# Patient Record
Sex: MALE | Race: WHITE
[De-identification: names, ages, dates, MRNs, and addresses within clinical notes are randomized per-mention and may not be internally consistent; named-entity substitution may affect disease eponyms.]

---

## 2019-07-13 ENCOUNTER — HOSPITAL ENCOUNTER (EMERGENCY)
Dept: HOSPITAL 7 - FB.ED | Age: 24
Discharge: HOME | End: 2019-07-13
Payer: COMMERCIAL

## 2019-07-13 DIAGNOSIS — L03.116: Primary | ICD-10-CM

## 2019-07-13 DIAGNOSIS — R59.1: ICD-10-CM

## 2019-07-13 PROCEDURE — 85025 COMPLETE CBC W/AUTO DIFF WBC: CPT

## 2019-07-13 PROCEDURE — 80053 COMPREHEN METABOLIC PANEL: CPT

## 2019-07-13 PROCEDURE — 96361 HYDRATE IV INFUSION ADD-ON: CPT

## 2019-07-13 PROCEDURE — 74177 CT ABD & PELVIS W/CONTRAST: CPT

## 2019-07-13 PROCEDURE — 86618 LYME DISEASE ANTIBODY: CPT

## 2019-07-13 PROCEDURE — 36415 COLL VENOUS BLD VENIPUNCTURE: CPT

## 2019-07-13 PROCEDURE — 86140 C-REACTIVE PROTEIN: CPT

## 2019-07-13 PROCEDURE — 99284 EMERGENCY DEPT VISIT MOD MDM: CPT

## 2019-07-13 PROCEDURE — 96374 THER/PROPH/DIAG INJ IV PUSH: CPT

## 2019-07-13 PROCEDURE — 81001 URINALYSIS AUTO W/SCOPE: CPT

## 2019-07-13 NOTE — EDM.PDOC
ED HPI GENERAL MEDICAL PROBLEM





- General


Chief Complaint: Fever


Stated Complaint: FEVER


Time Seen by Provider: 07/13/19 20:35


Source of Information: Reports: Patient


History Limitations: Reports: No Limitations





- History of Present Illness


INITIAL COMMENTS - FREE TEXT/NARRATIVE: 





24-year-old male who reports that approximately 3 AM today when he was getting 

off work he developed shaking chills and had generalized malaise. When he went 

home he noted that he had a fever of 102.6 later on that morning he continued 

to have intermittent chills and shaking through the day but no measured fever (

temp was 95-98F). This afternoon he began to develop left thigh and groin pain 

with some left lower quadrant abdominal pain. He had no nausea or vomiting. He 

been urinating normally. He did have a normal bowel movement this morning while 

he was at work still. There is been no sore throat. No cough. No nasal 

congestion. He has been drinking liquids well but his appetite has been 

somewhat off. No known tick exposure but they were on a camping trip over the 

Fourth of July. He is rating the pain in his left medial thigh, groin and lower 

abdomen as about a 6/10 when he is up and moving. It is a 1/10 when he is at 

rest. No body aches. The pain does radiate. There's been no trauma to the area 

of his leg. There is no redness or swelling in this area although he did notice 

a lump in his left groin area. There are no other associated signs or symptoms. 

There are no other modifying factors.


Onset: Today (3 AM)


Duration: Getting Worse


Location: Reports: Abdomen, Lower Extremity, Left (And groin)


Quality: Reports: Ache, Sharp, Stabbing, Throbbing


Severity: Moderate


Improves with: Reports: Rest


Worsens with: Reports: Other (Palpation), Movement


Context: Reports: Other (As above)


Associated Symptoms: Reports: Fever/Chills, Loss of Appetite


Treatments PTA: Reports: Other (see below) (Nothing)


  ** Left Inner Thigh


Pain Score (Numeric/FACES): 6





- Related Data


 Allergies











Allergy/AdvReac Type Severity Reaction Status Date / Time


 


No Known Allergies Allergy   Verified 07/13/19 20:33











Home Meds: 


 Home Meds





Cephalexin [Keflex] 750 mg PO TID 10 Days #90 capsule 07/13/19 [Rx]











Past Medical History





- Past Health History


Medical/Surgical History: Denies Medical/Surgical History (No chronic medical 

problems. Surgical history as detailed below.)





- Past Surgical History


HEENT Surgical History: Reports: Tonsillectomy





Social & Family History





- Tobacco Use


Smoking Status *Q: Never Smoker





- Alcohol Use


Alcohol Use History: Yes


Alcohol Use Comment: Occasional alcohol use.





- Living Situation & Occupation


Living situation: Reports: 


Occupation: Employed (Works at [employer].)


Social History Comment: He is here with his wife.





ED ROS GENERAL





- Review of Systems


Review Of Systems: See Below


Constitutional: Reports: Fever, Chills, Malaise


HEENT: Reports: No Symptoms


Respiratory: Reports: No Symptoms


Cardiovascular: Reports: No Symptoms


GI/Abdominal: Reports: Abdominal Pain (Left lower quadrant.)


: Reports: No Symptoms


Musculoskeletal: Reports: Other (Left groin and left medial thigh pain)


Skin: Reports: No Symptoms


Neurological: Reports: No Symptoms


Hematologic/Lymphatic: Reports: No Symptoms


Immunologic: Reports: Other (No known tick exposure.)





ED EXAM, GENERAL





- Physical Exam


Exam: See Below


Exam Limited By: No Limitations


General Appearance: Alert, WD/WN, Mild Distress


Eye Exam: Bilateral Eye: EOMI, Normal Inspection, PERRL


Ears: Normal External Exam


Ear Exam: Bilateral Ear: Auricle Normal


Nose: Normal Inspection, Normal Mucosa, No Blood


Throat/Mouth: Normal Voice, No Airway Compromise, Other (Dry mucous membranes)


Head: Atraumatic, Normocephalic


Neck: Normal Inspection, Supple, Non-Tender, Full Range of Motion


Respiratory/Chest: No Respiratory Distress, Lungs Clear, Normal Breath Sounds, 

No Accessory Muscle Use, Chest Non-Tender


Cardiovascular: Normal Peripheral Pulses, No JVD, No Murmur, Tachycardia (Mild)


Peripheral Pulses: 2+: Radial (L), Radial (R)


GI/Abdominal: Normal Bowel Sounds, Soft, No Distention, No Mass, Tender (Tender 

in the left lower quadrant area.)


 (Male) Exam: No: Hernia


Back Exam: Normal Inspection.  No: CVA Tenderness (R), CVA Tenderness (L)


Extremities: Normal Range of Motion, Normal Capillary Refill, Leg Pain (Tender 

to palpation along left medial proximal thigh. There is some adenopathy in the 

left groin that is tender to palpation.)


Neurological: Alert, Oriented, CN II-XII Intact, Normal Cognition, No Motor/

Sensory Deficits


Skin Exam: Warm, Dry, Intact, Normal Color, No Rash


Lymphatic: Adenopathy (As above in left groin area.)





Course





- Vital Signs


Last Recorded V/S: 


 Last Vital Signs











Temp  37.1 C   07/13/19 22:28


 


Pulse  88   07/13/19 22:28


 


Resp  18   07/13/19 22:28


 


BP  131/66   07/13/19 22:28


 


Pulse Ox  99   07/13/19 22:28














- Orders/Labs/Meds


Orders: 


 Active Orders 24 hr











 Category Date Time Status


 


 Abdomen Pelvis w Cont [CT] Stat Exams  07/13/19 21:13 Taken


 


 EHRLICHIA AB PANEL Routine Lab  07/13/19 21:00 Received


 


 LYME, TOTAL AB TEST/REFLEX Urgent Lab  07/13/19 23:00 Received


 


 Sodium Chloride 0.9% [Saline Flush] Med  07/13/19 20:49 Active





 10 ml FLUSH ASDIRECTED PRN   


 


 Peripheral IV Insertion Adult [OM.PC] Routine Oth  07/13/19 20:49 Ordered








 Medication Orders





Sodium Chloride (Saline Flush)  10 ml FLUSH ASDIRECTED PRN


   PRN Reason: Keep Vein Open


   Last Admin: 07/13/19 21:15  Dose: 10 ml








Labs: 


 Laboratory Tests











  07/13/19 07/13/19 07/13/19 Range/Units





  21:00 21:00 21:00 


 


WBC  10.3    (4.5-12.0)  X10-3/uL


 


RBC  4.77    (4.30-5.75)  x10(6)uL


 


Hgb  16.3    (13.5-17.8)  g/dL


 


Hct  45.5    (30.0-51.3)  %


 


MCV  95.4    (80-96)  fL


 


MCH  34.0 H    (27.7-33.6)  pg


 


MCHC  35.7 H    (32.2-35.4)  g/dL


 


RDW  11.9    (11.5-15.5)  %


 


Plt Count  136    (125-369)  X10(3)uL


 


MPV  9.2    (7.4-10.4)  fL


 


Add Manual Diff  Yes    


 


Neutrophils % (Manual)  91 H    (46-82)  %


 


Lymphocytes % (Manual)  2 L    (13-37)  %


 


Monocytes % (Manual)  6    (4-12)  %


 


Basophils % (Manual)  1    (0-2)  %


 


Sodium   141   (135-145)  mmol/L


 


Potassium   3.7   (3.5-5.3)  mmol/L


 


Chloride   106   (100-110)  mmol/L


 


Carbon Dioxide   25   (21-32)  mmol/L


 


BUN   14   (7-18)  mg/dL


 


Creatinine   1.1   (0.70-1.30)  mg/dL


 


Est Cr Clr Drug Dosing   118.32   mL/min


 


Estimated GFR (MDRD)   > 60   (>60)  


 


BUN/Creatinine Ratio   12.7   (9-20)  


 


Glucose   139 H   ()  mg/dL


 


Calcium   9.0   (8.6-10.2)  mg/dL


 


Total Bilirubin   3.3 H   (0.1-1.3)  mg/dL


 


AST   19   (5-25)  IU/L


 


ALT   45 H   (12-36)  U/L


 


Alkaline Phosphatase   54 L   ()  IU/L


 


C-Reactive Protein    10.7 H*  (0.5-0.9)  mg/dL


 


Total Protein   6.4   (6.0-8.0)  g/dL


 


Albumin   3.8   (3.5-5.2)  g/dL


 


Globulin   2.6   g/dL


 


Albumin/Globulin Ratio   1.5   


 


Urine Color     (YELLOW)  


 


Urine Appearance     (CLEAR)  


 


Urine pH     (5.0-6.5)  


 


Ur Specific Gravity     (1.010-1.025)  


 


Urine Protein     (NEGATIVE)  mg/dL


 


Urine Glucose (UA)     (NORMAL)  mg/dL


 


Urine Ketones     (NEGATIVE)  mg/dL


 


Urine Occult Blood     (NEGATIVE)  


 


Urine Nitrite     (NEGATIVE)  


 


Urine Bilirubin     (NEGATIVE)  


 


Urine Urobilinogen     (NEGATIVE)  mg/dL


 


Ur Leukocyte Esterase     (NEGATIVE)  


 


Urine RBC     (0-5)  


 


Urine WBC     (0-5)  


 


Ur Squamous Epith Cells     (NS,R,O)  


 


Urine Bacteria     (NS)  














  07/13/19 Range/Units





  21:05 


 


WBC   (4.5-12.0)  X10-3/uL


 


RBC   (4.30-5.75)  x10(6)uL


 


Hgb   (13.5-17.8)  g/dL


 


Hct   (30.0-51.3)  %


 


MCV   (80-96)  fL


 


MCH   (27.7-33.6)  pg


 


MCHC   (32.2-35.4)  g/dL


 


RDW   (11.5-15.5)  %


 


Plt Count   (125-369)  X10(3)uL


 


MPV   (7.4-10.4)  fL


 


Add Manual Diff   


 


Neutrophils % (Manual)   (46-82)  %


 


Lymphocytes % (Manual)   (13-37)  %


 


Monocytes % (Manual)   (4-12)  %


 


Basophils % (Manual)   (0-2)  %


 


Sodium   (135-145)  mmol/L


 


Potassium   (3.5-5.3)  mmol/L


 


Chloride   (100-110)  mmol/L


 


Carbon Dioxide   (21-32)  mmol/L


 


BUN   (7-18)  mg/dL


 


Creatinine   (0.70-1.30)  mg/dL


 


Est Cr Clr Drug Dosing   mL/min


 


Estimated GFR (MDRD)   (>60)  


 


BUN/Creatinine Ratio   (9-20)  


 


Glucose   ()  mg/dL


 


Calcium   (8.6-10.2)  mg/dL


 


Total Bilirubin   (0.1-1.3)  mg/dL


 


AST   (5-25)  IU/L


 


ALT   (12-36)  U/L


 


Alkaline Phosphatase   ()  IU/L


 


C-Reactive Protein   (0.5-0.9)  mg/dL


 


Total Protein   (6.0-8.0)  g/dL


 


Albumin   (3.5-5.2)  g/dL


 


Globulin   g/dL


 


Albumin/Globulin Ratio   


 


Urine Color  Yellow  (YELLOW)  


 


Urine Appearance  Clear  (CLEAR)  


 


Urine pH  7.0 H  (5.0-6.5)  


 


Ur Specific Gravity  1.010  (1.010-1.025)  


 


Urine Protein  Negative  (NEGATIVE)  mg/dL


 


Urine Glucose (UA)  Normal  (NORMAL)  mg/dL


 


Urine Ketones  Negative  (NEGATIVE)  mg/dL


 


Urine Occult Blood  Negative  (NEGATIVE)  


 


Urine Nitrite  Negative  (NEGATIVE)  


 


Urine Bilirubin  Negative  (NEGATIVE)  


 


Urine Urobilinogen  4 H  (NEGATIVE)  mg/dL


 


Ur Leukocyte Esterase  Negative  (NEGATIVE)  


 


Urine RBC  Not seen  (0-5)  


 


Urine WBC  0-5  (0-5)  


 


Ur Squamous Epith Cells  Rare  (NS,R,O)  


 


Urine Bacteria  Rare H  (NS)  











Meds: 


Medications











Generic Name Dose Route Start Last Admin





  Trade Name Freq  PRN Reason Stop Dose Admin


 


Sodium Chloride  10 ml  07/13/19 20:49  07/13/19 21:15





  Saline Flush  FLUSH   10 ml





  ASDIRECTED PRN   Administration





  Keep Vein Open   





     





     





     














Discontinued Medications














Generic Name Dose Route Start Last Admin





  Trade Name Freq  PRN Reason Stop Dose Admin


 


Ceftriaxone Sodium  2 gm  07/13/19 22:55  07/13/19 23:01





  Rocephin  IVPUSH  07/13/19 22:56  2 gm





  ONETIME ONE   Administration





     





     





     





     


 


Sodium Chloride  1,000 mls @ 999 mls/hr  07/13/19 20:51  07/13/19 21:25





  Normal Saline  IV  07/13/19 21:51  999 mls/hr





  .BOLUS ONE   Administration





     





     





     





     


 


Iopamidol  150 ml  07/13/19 21:30  07/13/19 21:46





  Isovue-370 (76%)  IV  07/13/19 21:31  134 ml





  ONETIME ONE   Administration





     





     





     





     














- Radiology Interpretation


Free Text/Narrative:: 





CT scan of abdomen and pelvis showed reactive lymph nodes in the left groin and 

left lower abdomen. There were no other acute abnormalities. This was per the 

radiologist.





- Re-Assessments/Exams


Free Text/Narrative Re-Assessment/Exam: 





07/13/19 22:50: I was called in by the patient secondary to a rash noted on his 

left anterior lower leg. He had noticed some discomfort in that area while he 

was sitting in the room and when he looked he noticed that there was some 

redness in the anterior left lower leg. Exam of the patient does show a 

cellulitis to the anterior left lower leg that is encompassing the entire lower 

half of the anterior lower leg. His blood tests are supporting an infection. 

The CT scan of his abdomen and pelvis to showed reactive lymph nodes. I will 

treat the patient with Rocephin 2 g IV now and will place the patient on Keflex 

750 mg 3 times a day for 10 days. I have also given him off work for the next 2 

days and he needs to stay at home with his left leg elevated as much as 

possible. He should follow up with his primary doctor.








Departure





- Departure


Time of Disposition: 23:25


Disposition: Home, Self-Care 01


Condition: Good


Clinical Impression: 


 Cellulitis of left lower leg, Reactive lymphadenopathy








- Discharge Information


Prescriptions: 


Cephalexin [Keflex] 750 mg PO TID 10 Days #90 capsule


Instructions:  Lymphadenopathy, Cellulitis, Adult, Easy-to-Read, Fever, Adult, 

Easy-to-Read


Referrals: 


PCP,None [Primary Care Provider] - 


Forms:  ED Department Discharge, ED Return to Work/School Form


Additional Instructions: 


You have an infection of the skin of your left lower leg. No work until 7/16/ 2019 at 10 pm. You will need to stay off of your left leg as much as possible 

and keep it elevated as much as possible for the next 2 days. Drink plenty of 

fluids. Take ibuprofen and Tylenol as needed for fever or pain. Medication as 

prescribed (Keflex). Back to the emergency department for trouble breathing, 

worsening infection, unrelenting vomiting or any other concerning sign or 

symptom.





- My Orders


Last 24 Hours: 


My Active Orders





07/13/19 20:49


Sodium Chloride 0.9% [Saline Flush]   10 ml FLUSH ASDIRECTED PRN 


Peripheral IV Insertion Adult [OM.PC] Routine 





07/13/19 21:00


EHRLICHIA AB PANEL Routine 





07/13/19 21:13


Abdomen Pelvis w Cont [CT] Stat 





07/13/19 23:00


LYME, TOTAL AB TEST/REFLEX Urgent 














- Assessment/Plan


Last 24 Hours: 


My Active Orders





07/13/19 20:49


Sodium Chloride 0.9% [Saline Flush]   10 ml FLUSH ASDIRECTED PRN 


Peripheral IV Insertion Adult [OM.PC] Routine 





07/13/19 21:00


EHRLICHIA AB PANEL Routine 





07/13/19 21:13


Abdomen Pelvis w Cont [CT] Stat 





07/13/19 23:00


LYME, TOTAL AB TEST/REFLEX Urgent

## 2019-07-17 LAB — LYME IGG/IGM AB: <0.91 ISR (ref 0–0.9)
